# Patient Record
Sex: FEMALE | Race: WHITE | NOT HISPANIC OR LATINO | Employment: OTHER | URBAN - METROPOLITAN AREA
[De-identification: names, ages, dates, MRNs, and addresses within clinical notes are randomized per-mention and may not be internally consistent; named-entity substitution may affect disease eponyms.]

---

## 2017-01-16 NOTE — PATIENT DISCUSSION
Cataract Counseling: Not visually significant to proceed with surgery at this time. I have discussed continuing with current spectacles vs updating. I have offerred the patient a new spectacle prescription to fill if desires. Return to follow up as schedled or sooner if symptoms arise.

## 2018-02-08 NOTE — PATIENT DISCUSSION
CATARACTS, OU - VISUALLY SIGNIFICANT. SCHEDULE OD FIRST THEN LATER IN OS  IF VISUAL SYMPTOMS PERSIST.

## 2018-02-20 NOTE — PATIENT DISCUSSION
New Prescription: Pred Forte (prednisolone acetate): drops,suspension: 1% 1 drop four times a day into affected eye 02-

## 2018-02-20 NOTE — PATIENT DISCUSSION
RIGHT EYE CATARACT SURGERY - PRE-OP INSTRUCTIONS:  I have reviewed the indications, alternatives, and risks of the procedure with the patient. These risks include partial or total loss of vision, infection in the eye, pain, and the need for additional surgery for complications including a broken posterior capsule, inability to completely remove the cataract, dropped nucleus, infection, or retinal detachment. The patient has given informed consent by signing the consent form. We will proceed with cataract surgery as planned. The patient has also been instructed in the usual pre-operative regimen including the need to use the antibiotic (Ocuflox) drops 4 times start 2 days before surgery, and the need to avoid eating or drinking anything except prescription medications after midnight on the morning of surgery. The patient was given the pre-printed Cataract Surgery Instructions' handout. The contents were carefully reviewed with the patient.

## 2018-02-20 NOTE — PATIENT DISCUSSION
New Prescription: Acular (ketorolac): drops: 0.5% 1 drop four times a day as directed into affected eye 02-

## 2018-03-01 NOTE — PATIENT DISCUSSION
1 DAY POST-OP, OD- Ocuflox-1 drop 4 times a day until next visit. Acular-1 drop 4 times a day until next visit. Pred-forte-start using today, 1 drop 4 times a day until next visit. Patient was also given instruction sheet.

## 2018-03-01 NOTE — PATIENT DISCUSSION
Continue: Pred Forte (prednisolone acetate): drops,suspension: 1% 1 drop four times a day into affected eye 02-

## 2018-03-05 NOTE — PATIENT DISCUSSION
1 WEEK POST-OP, OD-Stop Ocuflox and Acular. Continue Pred-forte, start tapering today. 1 drop 3 time a day for 1 week, 1 drop twice a day for 1 week and 1 drop once a day for 1 week. Then stop. Also given patient instruction sheet.

## 2018-03-05 NOTE — PATIENT DISCUSSION
LEFT EYE CATARACT SURGERY - PRE-OP INSTRUCTIONS:  I have reviewed the indications, alternatives, and risks of the procedure with the patient. These risks include partial or total loss of vision, infection in the eye, pain, and the need for additional surgery for complications including a broken posterior capsule, inability to completely remove the cataract, dropped nucleus, infection, or retinal detachment. The patient has given informed consent by signing the consent form. We will proceed with cataract surgery as planned. The patient has also been instructed in the usual pre-operative regimen including the need to use the antibiotic (Ocuflox) drops 4 times 2 days before surgery, and the need to avoid eating or drinking anything except prescription medications after midnight on the morning of surgery. The patient was given the pre-printed Cataract Surgery Instructions' handout. The contents were carefully reviewed with the patient.

## 2019-04-08 NOTE — PATIENT DISCUSSION
New Prescription: Maxitrol (neomycin-polymyxin-dexameth): drops,suspension: 3.5-10,000-0.1 mg/mL-unit/mL-% 1 drop four times a day as directed into right eye 04-

## 2019-04-08 NOTE — PATIENT DISCUSSION
CHALAZION RUL: I have explained to the patient that a chalazion is a mass associated with inflammation, obstruction, or retained secretions of one or more of the glands that lubricates the edge of the eyelids. Symptoms may include pain, inflammation, and drainage of the mass. This may resolve on its own or may require excision. I have discussed the options of removal versus following. The risks, benefits, alternatives include anesthesia, bleeding, infection, inflammation, swelling, bruising and scarring. The patient understands and desires to remove Chalazion-RUL. Gave patient RX for Erythromycin. Apply to affected area twice a day for 1 week.

## 2019-04-08 NOTE — PATIENT DISCUSSION
DRY EYES : Discussed with patient the importance of keeping the eye moist and the symptoms associated with dry eyes including blurry vision, tearing, burning, and mel sensation. Advised patient to minimize use of any fans blowing directly on the face. Advised patient to continue with artificial tears 2-3 times daily.

## 2019-06-20 NOTE — PATIENT DISCUSSION
CHALAZION RLL: I have explained to the patient that a chalazion is a mass associated with inflammation, obstruction, or retained secretions of one or more of the glands that lubricates the edge of the eyelids. Symptoms may include pain, inflammation, and drainage of the mass. This may resolve on its own or may require excision. I have discussed the options of removal versus following. The risks, benefits, alternatives include anesthesia, bleeding, infection, inflammation, swelling, bruising and scarring. The patient understands and desires to remove Chalazion-RLL. PT ALREADY HAS AN RX FOR NEOMYCIN DROPS THAT SHE IS TO CONTINUE TO USE  Apply to affected area twice a day for 1 week.

## 2020-04-20 NOTE — PATIENT DISCUSSION
CORI OU: INCREASE USE OF ARTIFICIAL TEARS 2-3 TIMES A DAY DUE TO SIGNIFICANT DRYNESS AND PATIENT HAVING ISSUES WITH DEHYDRATION. RETURN FOR FOLLOW-UP AS SCHEDULED.

## 2022-07-13 ENCOUNTER — NEW PATIENT (OUTPATIENT)
Dept: URBAN - METROPOLITAN AREA CLINIC 38 | Facility: CLINIC | Age: 62
End: 2022-07-13

## 2022-07-13 DIAGNOSIS — H04.123: ICD-10-CM

## 2022-07-13 DIAGNOSIS — H25.813: ICD-10-CM

## 2022-07-13 DIAGNOSIS — H40.013: ICD-10-CM

## 2022-07-13 PROCEDURE — 92133 CPTRZD OPH DX IMG PST SGM ON: CPT

## 2022-07-13 PROCEDURE — 99204 OFFICE O/P NEW MOD 45 MIN: CPT

## 2022-07-13 ASSESSMENT — VISUAL ACUITY
OS_CC: 20/20-2
OD_CC: 20/20

## 2022-07-13 ASSESSMENT — TONOMETRY
OS_IOP_MMHG: 21
OD_IOP_MMHG: 24
OS_IOP_MMHG: 22
OD_IOP_MMHG: 21

## 2022-12-21 ENCOUNTER — FOLLOW UP (OUTPATIENT)
Dept: URBAN - METROPOLITAN AREA CLINIC 38 | Facility: CLINIC | Age: 62
End: 2022-12-21

## 2022-12-21 PROCEDURE — 92250 FUNDUS PHOTOGRAPHY W/I&R: CPT

## 2022-12-21 PROCEDURE — 92015 DETERMINE REFRACTIVE STATE: CPT

## 2022-12-21 PROCEDURE — 92012 INTRM OPH EXAM EST PATIENT: CPT

## 2022-12-21 PROCEDURE — 92083 EXTENDED VISUAL FIELD XM: CPT

## 2022-12-21 ASSESSMENT — VISUAL ACUITY
OD_CC: 20/20-1
OS_CC: 20/20

## 2022-12-21 ASSESSMENT — TONOMETRY
OD_IOP_MMHG: 21
OS_IOP_MMHG: 22

## 2023-06-16 ENCOUNTER — COMPREHENSIVE EXAM (OUTPATIENT)
Dept: URBAN - METROPOLITAN AREA CLINIC 38 | Facility: CLINIC | Age: 63
End: 2023-06-16

## 2023-06-16 DIAGNOSIS — H25.813: ICD-10-CM

## 2023-06-16 DIAGNOSIS — H04.123: ICD-10-CM

## 2023-06-16 DIAGNOSIS — H52.03: ICD-10-CM

## 2023-06-16 DIAGNOSIS — H52.203: ICD-10-CM

## 2023-06-16 DIAGNOSIS — H40.013: ICD-10-CM

## 2023-06-16 DIAGNOSIS — H52.4: ICD-10-CM

## 2023-06-16 PROCEDURE — 92133 CPTRZD OPH DX IMG PST SGM ON: CPT

## 2023-06-16 PROCEDURE — 92014 COMPRE OPH EXAM EST PT 1/>: CPT

## 2023-06-16 PROCEDURE — 92015 DETERMINE REFRACTIVE STATE: CPT

## 2023-06-16 ASSESSMENT — TONOMETRY
OS_IOP_MMHG: 22
OD_IOP_MMHG: 21

## 2023-06-16 ASSESSMENT — VISUAL ACUITY
OU_CC: 20/20
OU_CC: J1
OS_CC: J1
OS_CC: 20/20-1
OD_CC: 20/25-2
OD_CC: J1